# Patient Record
Sex: MALE | URBAN - METROPOLITAN AREA
[De-identification: names, ages, dates, MRNs, and addresses within clinical notes are randomized per-mention and may not be internally consistent; named-entity substitution may affect disease eponyms.]

---

## 2019-01-17 ENCOUNTER — APPOINTMENT (RX ONLY)
Dept: URBAN - METROPOLITAN AREA CLINIC 131 | Facility: CLINIC | Age: 84
Setting detail: DERMATOLOGY
End: 2019-01-17

## 2019-01-17 DIAGNOSIS — I87.2 VENOUS INSUFFICIENCY (CHRONIC) (PERIPHERAL): ICD-10-CM

## 2019-01-17 PROCEDURE — ? UNNA BOOT APPLICATION

## 2019-01-17 PROCEDURE — 29580 STRAPPING UNNA BOOT: CPT | Mod: 50

## 2019-01-17 PROCEDURE — ? COUNSELING

## 2019-01-17 ASSESSMENT — LOCATION DETAILED DESCRIPTION DERM
LOCATION DETAILED: RIGHT DISTAL PRETIBIAL REGION
LOCATION DETAILED: LEFT DISTAL PRETIBIAL REGION

## 2019-01-17 ASSESSMENT — LOCATION SIMPLE DESCRIPTION DERM
LOCATION SIMPLE: RIGHT PRETIBIAL REGION
LOCATION SIMPLE: LEFT PRETIBIAL REGION

## 2019-01-17 ASSESSMENT — LOCATION ZONE DERM: LOCATION ZONE: LEG

## 2019-01-17 NOTE — HPI: RASH
What Type Of Note Output Would You Prefer (Optional)?: Standard Output
How Severe Is Your Rash?: moderate
Is This A New Presentation, Or A Follow-Up?: Rash
Additional History: Pt has been using AmLactin without help.

## 2019-01-17 NOTE — PROCEDURE: UNNA BOOT APPLICATION
Was A Previous Unna Boot Removed?: No
Indication: stasis ulcer
Detail Level: Detailed
After Unna Boot Application Text: [employer] was used to wrap the outside of [employer] and we ensured the [employer] wasn't too tight prior to the patient leaving the office.
Medication Occluded Under Unnaboot: Vaseline
Removal Of Previous Unna Boot (Yes) Text: The previous [employer] and then the site was cleaned in preparation for another [employer] application.
Location Applied: both legs
Removal Of Previous Unna Boot (No) Text: The site was cleaned in preparation for an [facility] application.

## 2019-01-24 ENCOUNTER — APPOINTMENT (RX ONLY)
Dept: URBAN - METROPOLITAN AREA CLINIC 131 | Facility: CLINIC | Age: 84
Setting detail: DERMATOLOGY
End: 2019-01-24

## 2019-01-24 DIAGNOSIS — I87.2 VENOUS INSUFFICIENCY (CHRONIC) (PERIPHERAL): ICD-10-CM

## 2019-01-24 PROCEDURE — ? UNNA BOOT APPLICATION

## 2019-01-24 PROCEDURE — 29580 STRAPPING UNNA BOOT: CPT | Mod: 50

## 2019-01-24 PROCEDURE — ? COUNSELING

## 2019-01-24 ASSESSMENT — LOCATION DETAILED DESCRIPTION DERM
LOCATION DETAILED: LEFT PROXIMAL PRETIBIAL REGION
LOCATION DETAILED: RIGHT DISTAL PRETIBIAL REGION

## 2019-01-24 ASSESSMENT — LOCATION SIMPLE DESCRIPTION DERM
LOCATION SIMPLE: RIGHT PRETIBIAL REGION
LOCATION SIMPLE: LEFT PRETIBIAL REGION

## 2019-01-24 ASSESSMENT — LOCATION ZONE DERM: LOCATION ZONE: LEG

## 2019-01-24 NOTE — PROCEDURE: UNNA BOOT APPLICATION
Was A Previous Unna Boot Removed?: Yes
Indication: chronic unresponsive dermatitis
Detail Level: Detailed
Location Applied: both legs
Removal Of Previous Unna Boot (No) Text: The site was cleaned in preparation for an [facility] application.
Medication Occluded Under Unnaboot: Vaseline
After Unna Boot Application Text: Coban was used to wrap the outside of [employer] and we ensured the [employer] wasn't too tight prior to the patient leaving the office.
Removal Of Previous Unna Boot (Yes) Text: The previous [employer] and then the site was cleaned in preparation for another [employer] application.

## 2019-01-31 ENCOUNTER — APPOINTMENT (RX ONLY)
Dept: URBAN - METROPOLITAN AREA CLINIC 131 | Facility: CLINIC | Age: 84
Setting detail: DERMATOLOGY
End: 2019-01-31

## 2019-01-31 DIAGNOSIS — I87.2 VENOUS INSUFFICIENCY (CHRONIC) (PERIPHERAL): ICD-10-CM

## 2019-01-31 PROCEDURE — ? UNNA BOOT APPLICATION

## 2019-01-31 PROCEDURE — 29580 STRAPPING UNNA BOOT: CPT | Mod: 50

## 2019-01-31 NOTE — PROCEDURE: UNNA BOOT APPLICATION
Detail Level: Detailed
Location Applied: both legs
After Unna Boot Application Text: Coban was used to wrap the outside of [employer] and we ensured the [employer] wasn't too tight prior to the patient leaving the office.
Indication: venous insufficiency
Medication Occluded Under Unnaboot: Vaseline
Was A Previous Unna Boot Removed?: No
Removal Of Previous Unna Boot (Yes) Text: The previous [employer] and then the site was cleaned in preparation for another [employer] application.
Removal Of Previous Unna Boot (No) Text: The site was cleaned in preparation for an [facility] application.

## 2019-02-07 ENCOUNTER — APPOINTMENT (RX ONLY)
Dept: URBAN - METROPOLITAN AREA CLINIC 131 | Facility: CLINIC | Age: 84
Setting detail: DERMATOLOGY
End: 2019-02-07

## 2019-02-07 DIAGNOSIS — I87.2 VENOUS INSUFFICIENCY (CHRONIC) (PERIPHERAL): ICD-10-CM

## 2019-02-07 PROCEDURE — ? UNNA BOOT APPLICATION

## 2019-02-07 PROCEDURE — 29580 STRAPPING UNNA BOOT: CPT | Mod: 50

## 2019-02-07 ASSESSMENT — LOCATION DETAILED DESCRIPTION DERM
LOCATION DETAILED: RIGHT PROXIMAL PRETIBIAL REGION
LOCATION DETAILED: LEFT PROXIMAL PRETIBIAL REGION

## 2019-02-07 ASSESSMENT — LOCATION SIMPLE DESCRIPTION DERM
LOCATION SIMPLE: RIGHT PRETIBIAL REGION
LOCATION SIMPLE: LEFT PRETIBIAL REGION

## 2019-02-07 ASSESSMENT — LOCATION ZONE DERM: LOCATION ZONE: LEG

## 2019-02-07 NOTE — PROCEDURE: UNNA BOOT APPLICATION
Medication Occluded Under Unnaboot: Vaseline
Was A Previous Unna Boot Removed?: Yes
Detail Level: Detailed
Indication: venous insufficiency
Location Applied: both legs
Removal Of Previous Unna Boot (No) Text: The site was cleaned in preparation for an [facility] application.
After Unna Boot Application Text: Coban was used to wrap the outside of [employer] and we ensured the [employer] wasn't too tight prior to the patient leaving the office.
Removal Of Previous Unna Boot (Yes) Text: The previous [employer] and then the site was cleaned in preparation for another [employer] application.

## 2019-02-11 ENCOUNTER — APPOINTMENT (RX ONLY)
Dept: URBAN - METROPOLITAN AREA CLINIC 131 | Facility: CLINIC | Age: 84
Setting detail: DERMATOLOGY
End: 2019-02-11

## 2019-02-11 DIAGNOSIS — I87.2 VENOUS INSUFFICIENCY (CHRONIC) (PERIPHERAL): ICD-10-CM

## 2019-02-11 PROCEDURE — 29580 STRAPPING UNNA BOOT: CPT | Mod: 50

## 2019-02-11 PROCEDURE — ? UNNA BOOT APPLICATION

## 2019-02-11 PROCEDURE — ? PRESCRIPTION

## 2019-02-11 RX ORDER — DOXYCYCLINE HYCLATE 100 MG/1
CAPSULE, GELATIN COATED ORAL
Qty: 20 | Refills: 0 | Status: ERX | COMMUNITY
Start: 2019-02-11

## 2019-02-11 RX ORDER — TRIAMCINOLONE ACETONIDE 1 MG/G
OINTMENT TOPICAL
Qty: 1 | Refills: 3 | Status: ERX | COMMUNITY
Start: 2019-02-11

## 2019-02-11 RX ADMIN — TRIAMCINOLONE ACETONIDE: 1 OINTMENT TOPICAL at 00:00

## 2019-02-11 RX ADMIN — DOXYCYCLINE HYCLATE: 100 CAPSULE, GELATIN COATED ORAL at 00:00

## 2019-02-11 ASSESSMENT — LOCATION SIMPLE DESCRIPTION DERM
LOCATION SIMPLE: LEFT PRETIBIAL REGION
LOCATION SIMPLE: RIGHT PRETIBIAL REGION

## 2019-02-11 ASSESSMENT — LOCATION ZONE DERM: LOCATION ZONE: LEG

## 2019-02-11 NOTE — PROCEDURE: UNNA BOOT APPLICATION
Indication: venous insufficiency
After Unna Boot Application Text: Coban was used to wrap the outside of [employer] and we ensured the [employer] wasn't too tight prior to the patient leaving the office.
Detail Level: Detailed
Medication Occluded Under Unnaboot: Vaseline
Removal Of Previous Unna Boot (No) Text: The site was cleaned in preparation for an [facility] application.
Removal Of Previous Unna Boot (Yes) Text: The previous [employer] and then the site was cleaned in preparation for another [employer] application.
Was A Previous Unna Boot Removed?: Yes
Location Applied: both legs

## 2019-02-19 ENCOUNTER — APPOINTMENT (RX ONLY)
Dept: URBAN - METROPOLITAN AREA CLINIC 106 | Facility: CLINIC | Age: 84
Setting detail: DERMATOLOGY
End: 2019-02-19

## 2019-02-19 ENCOUNTER — APPOINTMENT (RX ONLY)
Dept: URBAN - METROPOLITAN AREA CLINIC 131 | Facility: CLINIC | Age: 84
Setting detail: DERMATOLOGY
End: 2019-02-19

## 2019-02-19 DIAGNOSIS — I87.2 VENOUS INSUFFICIENCY (CHRONIC) (PERIPHERAL): ICD-10-CM

## 2019-02-19 PROCEDURE — ? COUNSELING

## 2019-02-19 PROCEDURE — 29580 STRAPPING UNNA BOOT: CPT | Mod: 50

## 2019-02-19 PROCEDURE — ? UNNA BOOT APPLICATION

## 2019-02-19 ASSESSMENT — LOCATION SIMPLE DESCRIPTION DERM
LOCATION SIMPLE: LEFT PRETIBIAL REGION
LOCATION SIMPLE: RIGHT PRETIBIAL REGION

## 2019-02-19 ASSESSMENT — LOCATION DETAILED DESCRIPTION DERM
LOCATION DETAILED: LEFT DISTAL PRETIBIAL REGION
LOCATION DETAILED: RIGHT PROXIMAL PRETIBIAL REGION

## 2019-02-19 ASSESSMENT — LOCATION ZONE DERM: LOCATION ZONE: LEG

## 2019-02-19 NOTE — PROCEDURE: UNNA BOOT APPLICATION
Medication Occluded Under Unnaboot: Triamcinolone
Detail Level: Detailed
After Unna Boot Application Text: [employer] was used to wrap the outside of [employer] and we ensured the [employer] wasn't too tight prior to the patient leaving the office.
Removal Of Previous Unna Boot (No) Text: The site was cleaned in preparation for an [facility] application.
Removal Of Previous Unna Boot (Yes) Text: The previous [employer] and then the site was cleaned in preparation for another [employer] application.
Location Applied: both legs
Indication: venous insufficiency
Was A Previous Unna Boot Removed?: Yes

## 2019-02-28 ENCOUNTER — APPOINTMENT (RX ONLY)
Dept: URBAN - METROPOLITAN AREA CLINIC 131 | Facility: CLINIC | Age: 84
Setting detail: DERMATOLOGY
End: 2019-02-28

## 2019-02-28 DIAGNOSIS — I87.2 VENOUS INSUFFICIENCY (CHRONIC) (PERIPHERAL): ICD-10-CM

## 2019-02-28 PROCEDURE — ? PATIENT SPECIFIC COUNSELING

## 2019-02-28 PROCEDURE — 29580 STRAPPING UNNA BOOT: CPT | Mod: 50

## 2019-02-28 PROCEDURE — ? COUNSELING

## 2019-02-28 PROCEDURE — ? UNNA BOOT APPLICATION

## 2019-02-28 ASSESSMENT — LOCATION ZONE DERM: LOCATION ZONE: LEG

## 2019-02-28 ASSESSMENT — LOCATION SIMPLE DESCRIPTION DERM
LOCATION SIMPLE: RIGHT PRETIBIAL REGION
LOCATION SIMPLE: LEFT PRETIBIAL REGION

## 2019-02-28 NOTE — PROCEDURE: PATIENT SPECIFIC COUNSELING
Detail Level: Simple
It was explained to the patient that he will wear the unna boots for one more week. He was instructed to buy compression stockings to begin wearing next week.

## 2019-02-28 NOTE — PROCEDURE: UNNA BOOT APPLICATION
Removal Of Previous Unna Boot (Yes) Text: The previous [employer] and then the site was cleaned in preparation for another [employer] application.
Removal Of Previous Unna Boot (No) Text: The site was cleaned in preparation for an [facility] application.
Was A Previous Unna Boot Removed?: No
Location Applied: both legs
Indication: venous insufficiency
Medication Occluded Under Unnaboot: Triamcinolone
Detail Level: Detailed
After Unna Boot Application Text: Coban was used to wrap the outside of [employer] and we ensured the [employer] wasn't too tight prior to the patient leaving the office.

## 2019-03-07 ENCOUNTER — APPOINTMENT (RX ONLY)
Dept: URBAN - METROPOLITAN AREA CLINIC 131 | Facility: CLINIC | Age: 84
Setting detail: DERMATOLOGY
End: 2019-03-07

## 2019-03-07 DIAGNOSIS — I87.2 VENOUS INSUFFICIENCY (CHRONIC) (PERIPHERAL): ICD-10-CM

## 2019-03-07 PROCEDURE — ? ADDITIONAL NOTES

## 2019-03-07 PROCEDURE — 99212 OFFICE O/P EST SF 10 MIN: CPT | Mod: NC

## 2019-03-07 ASSESSMENT — LOCATION DETAILED DESCRIPTION DERM
LOCATION DETAILED: LEFT DISTAL PRETIBIAL REGION
LOCATION DETAILED: RIGHT DISTAL PRETIBIAL REGION

## 2019-03-07 ASSESSMENT — LOCATION ZONE DERM: LOCATION ZONE: LEG

## 2019-03-07 ASSESSMENT — LOCATION SIMPLE DESCRIPTION DERM
LOCATION SIMPLE: LEFT PRETIBIAL REGION
LOCATION SIMPLE: RIGHT PRETIBIAL REGION

## 2019-03-14 ENCOUNTER — APPOINTMENT (RX ONLY)
Dept: URBAN - METROPOLITAN AREA CLINIC 131 | Facility: CLINIC | Age: 84
Setting detail: DERMATOLOGY
End: 2019-03-14

## 2019-03-14 DIAGNOSIS — I87.2 VENOUS INSUFFICIENCY (CHRONIC) (PERIPHERAL): ICD-10-CM

## 2019-03-14 PROCEDURE — ? COUNSELING

## 2019-03-14 PROCEDURE — 99212 OFFICE O/P EST SF 10 MIN: CPT

## 2019-03-14 ASSESSMENT — LOCATION DETAILED DESCRIPTION DERM
LOCATION DETAILED: LEFT PROXIMAL PRETIBIAL REGION
LOCATION DETAILED: RIGHT DISTAL PRETIBIAL REGION

## 2019-03-14 ASSESSMENT — LOCATION ZONE DERM: LOCATION ZONE: LEG

## 2019-03-14 ASSESSMENT — LOCATION SIMPLE DESCRIPTION DERM
LOCATION SIMPLE: LEFT PRETIBIAL REGION
LOCATION SIMPLE: RIGHT PRETIBIAL REGION

## 2019-05-16 ENCOUNTER — APPOINTMENT (RX ONLY)
Dept: URBAN - METROPOLITAN AREA CLINIC 131 | Facility: CLINIC | Age: 84
Setting detail: DERMATOLOGY
End: 2019-05-16

## 2019-05-16 DIAGNOSIS — I87.2 VENOUS INSUFFICIENCY (CHRONIC) (PERIPHERAL): ICD-10-CM

## 2019-05-16 PROCEDURE — ? UNNA BOOT APPLICATION

## 2019-05-16 PROCEDURE — 29580 STRAPPING UNNA BOOT: CPT | Mod: 50

## 2019-05-16 PROCEDURE — ? COUNSELING

## 2019-05-16 ASSESSMENT — LOCATION DETAILED DESCRIPTION DERM
LOCATION DETAILED: LEFT PROXIMAL PRETIBIAL REGION
LOCATION DETAILED: RIGHT PROXIMAL PRETIBIAL REGION

## 2019-05-16 ASSESSMENT — LOCATION ZONE DERM: LOCATION ZONE: LEG

## 2019-05-16 ASSESSMENT — LOCATION SIMPLE DESCRIPTION DERM
LOCATION SIMPLE: RIGHT PRETIBIAL REGION
LOCATION SIMPLE: LEFT PRETIBIAL REGION

## 2019-05-16 NOTE — HPI: RASH (STASIS DERMATITIS)
How Severe Is It?: moderate
Is This A New Presentation, Or A Follow-Up?: Follow Up Stasis Dermatitis
Additional History: Pt states he canât wear compression stockings. He has been seeing his PCP who has given him an ointment to use with ace bandages. He just started the process yesterday so isnât sure if it has helped.

## 2019-05-16 NOTE — PROCEDURE: UNNA BOOT APPLICATION
Was A Previous Unna Boot Removed?: No
Location Applied: both legs
Detail Level: Detailed
Removal Of Previous Unna Boot (No) Text: The site was cleaned in preparation for an [facility] application.
After Unna Boot Application Text: Coban was used to wrap the outside of [employer] and we ensured the [employer] wasn't too tight prior to the patient leaving the office.
Medication Occluded Under Unnaboot: Vaseline
Indication: venous insufficiency
Removal Of Previous Unna Boot (Yes) Text: The previous [employer] and then the site was cleaned in preparation for another [employer] application.

## 2019-05-23 ENCOUNTER — APPOINTMENT (RX ONLY)
Dept: URBAN - METROPOLITAN AREA CLINIC 131 | Facility: CLINIC | Age: 84
Setting detail: DERMATOLOGY
End: 2019-05-23

## 2019-05-23 DIAGNOSIS — I87.2 VENOUS INSUFFICIENCY (CHRONIC) (PERIPHERAL): ICD-10-CM

## 2019-05-23 PROCEDURE — ? COUNSELING

## 2019-05-23 PROCEDURE — 29580 STRAPPING UNNA BOOT: CPT | Mod: 50

## 2019-05-23 PROCEDURE — ? UNNA BOOT APPLICATION

## 2019-05-23 ASSESSMENT — LOCATION ZONE DERM: LOCATION ZONE: LEG

## 2019-05-23 ASSESSMENT — LOCATION SIMPLE DESCRIPTION DERM
LOCATION SIMPLE: LEFT PRETIBIAL REGION
LOCATION SIMPLE: RIGHT PRETIBIAL REGION

## 2019-05-23 NOTE — PROCEDURE: UNNA BOOT APPLICATION
Indication: venous insufficiency
Detail Level: Detailed
Location Applied: both legs
After Unna Boot Application Text: Coban was used to wrap the outside of [employer] and we ensured the [employer] wasn't too tight prior to the patient leaving the office.
Removal Of Previous Unna Boot (No) Text: The site was cleaned in preparation for an [facility] application.
Was A Previous Unna Boot Removed?: Yes
Medication Occluded Under Unnaboot: Vaseline
Removal Of Previous Unna Boot (Yes) Text: The previous [employer] and then the site was cleaned in preparation for another [employer] application.

## 2019-05-30 ENCOUNTER — APPOINTMENT (RX ONLY)
Dept: URBAN - METROPOLITAN AREA CLINIC 131 | Facility: CLINIC | Age: 84
Setting detail: DERMATOLOGY
End: 2019-05-30

## 2019-05-30 DIAGNOSIS — I87.2 VENOUS INSUFFICIENCY (CHRONIC) (PERIPHERAL): ICD-10-CM

## 2019-05-30 PROCEDURE — 29580 STRAPPING UNNA BOOT: CPT | Mod: LT

## 2019-05-30 PROCEDURE — ? UNNA BOOT APPLICATION

## 2019-05-30 ASSESSMENT — LOCATION ZONE DERM: LOCATION ZONE: LEG

## 2019-05-30 ASSESSMENT — LOCATION SIMPLE DESCRIPTION DERM: LOCATION SIMPLE: LEFT PRETIBIAL REGION

## 2019-05-30 ASSESSMENT — LOCATION DETAILED DESCRIPTION DERM: LOCATION DETAILED: LEFT PROXIMAL PRETIBIAL REGION

## 2019-05-30 NOTE — PROCEDURE: UNNA BOOT APPLICATION
Detail Level: Detailed
Was A Previous Unna Boot Removed?: No
Indication: venous insufficiency
After Unna Boot Application Text: Cob[employer] was used to wrap the outside of [employer] and we ensured the [employer] wasn't too tight prior to the patient leaving the office.
Medication Occluded Under Unnaboot: Vaseline
Removal Of Previous Unna Boot (No) Text: The site was cleaned in preparation for an [facility] application.
Location Applied: the left leg
Removal Of Previous Unna Boot (Yes) Text: The previous [employer] and then the site was cleaned in preparation for another [employer] application.

## 2019-06-06 ENCOUNTER — APPOINTMENT (RX ONLY)
Dept: URBAN - METROPOLITAN AREA CLINIC 131 | Facility: CLINIC | Age: 84
Setting detail: DERMATOLOGY
End: 2019-06-06

## 2019-06-06 DIAGNOSIS — I87.2 VENOUS INSUFFICIENCY (CHRONIC) (PERIPHERAL): ICD-10-CM | Status: IMPROVED

## 2019-06-06 PROCEDURE — ? COUNSELING

## 2019-06-06 PROCEDURE — 99212 OFFICE O/P EST SF 10 MIN: CPT | Mod: NC

## 2019-06-06 ASSESSMENT — LOCATION ZONE DERM: LOCATION ZONE: LEG

## 2019-06-06 ASSESSMENT — LOCATION SIMPLE DESCRIPTION DERM
LOCATION SIMPLE: RIGHT PRETIBIAL REGION
LOCATION SIMPLE: LEFT PRETIBIAL REGION

## 2019-06-13 ENCOUNTER — APPOINTMENT (RX ONLY)
Dept: URBAN - METROPOLITAN AREA CLINIC 131 | Facility: CLINIC | Age: 84
Setting detail: DERMATOLOGY
End: 2019-06-13

## 2019-06-13 DIAGNOSIS — I87.2 VENOUS INSUFFICIENCY (CHRONIC) (PERIPHERAL): ICD-10-CM

## 2019-06-13 PROCEDURE — 29580 STRAPPING UNNA BOOT: CPT | Mod: 50

## 2019-06-13 PROCEDURE — ? UNNA BOOT APPLICATION

## 2019-06-13 ASSESSMENT — LOCATION DETAILED DESCRIPTION DERM
LOCATION DETAILED: RIGHT DISTAL PRETIBIAL REGION
LOCATION DETAILED: LEFT DISTAL PRETIBIAL REGION

## 2019-06-13 ASSESSMENT — LOCATION SIMPLE DESCRIPTION DERM
LOCATION SIMPLE: RIGHT PRETIBIAL REGION
LOCATION SIMPLE: LEFT PRETIBIAL REGION

## 2019-06-13 ASSESSMENT — LOCATION ZONE DERM: LOCATION ZONE: LEG

## 2019-06-13 NOTE — PROCEDURE: UNNA BOOT APPLICATION
Removal Of Previous Unna Boot (Yes) Text: The previous [employer] and then the site was cleaned in preparation for another [employer] application.
After Unna Boot Application Text: Coban was used to wrap the outside of [employer] and we ensured the [employer] wasn't too tight prior to the patient leaving the office.
Removal Of Previous Unna Boot (No) Text: The site was cleaned in preparation for an [facility] application.
Location Applied: both legs
Indication: venous insufficiency
Detail Level: Detailed
Was A Previous Unna Boot Removed?: No

## 2019-06-20 ENCOUNTER — APPOINTMENT (RX ONLY)
Dept: URBAN - METROPOLITAN AREA CLINIC 131 | Facility: CLINIC | Age: 84
Setting detail: DERMATOLOGY
End: 2019-06-20

## 2019-06-20 DIAGNOSIS — I87.2 VENOUS INSUFFICIENCY (CHRONIC) (PERIPHERAL): ICD-10-CM

## 2019-06-20 PROCEDURE — 29580 STRAPPING UNNA BOOT: CPT | Mod: LT

## 2019-06-20 PROCEDURE — ? UNNA BOOT APPLICATION

## 2019-06-20 NOTE — PROCEDURE: UNNA BOOT APPLICATION
Was A Previous Unna Boot Removed?: No
Removal Of Previous Unna Boot (No) Text: The site was cleaned in preparation for an [facility] application.
Medication Occluded Under Unnaboot: Vaseline
Removal Of Previous Unna Boot (Yes) Text: The previous [employer] and then the site was cleaned in preparation for another [employer] application.
After Unna Boot Application Text: Coban was used to wrap the outside of [employer] and we ensured the [employer] wasn't too tight prior to the patient leaving the office.
Indication: venous insufficiency
Detail Level: Detailed
Location Applied: the left leg

## 2021-01-14 DIAGNOSIS — Z23 NEED FOR VACCINATION: ICD-10-CM

## 2023-12-08 ENCOUNTER — APPOINTMENT (RX ONLY)
Dept: URBAN - METROPOLITAN AREA CLINIC 106 | Facility: CLINIC | Age: 88
Setting detail: DERMATOLOGY
End: 2023-12-08

## 2023-12-08 DIAGNOSIS — I87.2 VENOUS INSUFFICIENCY (CHRONIC) (PERIPHERAL): ICD-10-CM

## 2023-12-08 DIAGNOSIS — I89.0 LYMPHEDEMA, NOT ELSEWHERE CLASSIFIED: ICD-10-CM

## 2023-12-08 DIAGNOSIS — L98.1 FACTITIAL DERMATITIS: ICD-10-CM

## 2023-12-08 PROCEDURE — ? COUNSELING

## 2023-12-08 PROCEDURE — 99212 OFFICE O/P EST SF 10 MIN: CPT
